# Patient Record
Sex: FEMALE | Race: WHITE | Employment: FULL TIME | URBAN - METROPOLITAN AREA
[De-identification: names, ages, dates, MRNs, and addresses within clinical notes are randomized per-mention and may not be internally consistent; named-entity substitution may affect disease eponyms.]

---

## 2019-07-29 ENCOUNTER — HOSPITAL ENCOUNTER (OUTPATIENT)
Dept: DIABETES SERVICES | Age: 64
Discharge: HOME | End: 2019-07-29
Payer: COMMERCIAL

## 2019-07-29 DIAGNOSIS — E11.9 TYPE 2 DIABETES MELLITUS WITHOUT COMPLICATION, UNSPECIFIED WHETHER LONG TERM INSULIN USE (HCC): ICD-10-CM

## 2019-07-29 PROCEDURE — 97802 MEDICAL NUTRITION INDIV IN: CPT

## 2019-07-29 PROCEDURE — 97802 MEDICAL NUTRITION INDIV IN: CPT | Performed by: DIETITIAN, REGISTERED

## 2019-07-29 NOTE — DIABETES MGMT
[facility]  [facility] Treatment  Center - Nutrition Evaluation       DATE: 2019      REFERRING PHYSICIAN: Jaydon Mcclelland MD  NAME: Vannessa El : 1955   AGE: 66 y.o. GENDER: female  REASON FOR VISIT: MNT/ Meal Planning for Diabetes    ASSESSMENT:  Past Medical Hx: 66year old  female with a history significant for diabetes, hypertension, overweight, among others. She came to visit by herself today. LABS: Only lab for evaluation is A1C on 19 where she had a result of 6.6%. We reviewed her results as well as her target range. She does not believe her A1C has ever been >8.0%. We discussed diagnostic criteria for diabetes vs prediabetes. She verbalized understanding. MEDICATIONS/SUPPLEMENTS:   Sharee Batista reports taking the following medications:  -Metformin 500 mg 2x/day (AM and PM)  -Simvastatin once daily (unknown amount)  -Lasartan once daily (unknown amount)  -Amlodipine once daily (unknown amount)    **She has a goal of getting off the metformin with changes to diet and exercise. She appears highly motivated to make these changes.    -She is also checking her BG once weekly. She will check every Thursday morning, fasting. She keeps a log in her phone that she brought with her today. Her numbers fluctuate but are anywhere from 120-170 mg/dL. She was encouraged to start to keep more notes to help identify what is affecting her numbers. FOOD ALLERGIES/INTOLERANCES: None reported    ANTHROPOMETRICS:    19  Ht: 64\"  Wt: 191.3#  IBW: 120 # +/- 10%    Adjusted IBW: 138 #  BMI: 32.83 kg/m2     Reported Wt Hx: Sharee Batista reports no recent weight loss or weight gain that she is aware of. She expressed a desire to lose weight, and has been trying to do that for a few months now.     Estimated Nutritional Needs:   8711-8187 kcal/day (using ABW x 27- ABW x 30 kcal/kg)    Reported Diet Hx:     24 Hour Diet Recall  Breakfast 10 AM Whole grain english muffin with a fried egg and sausage chandu   Lunch 2 PM [place] (white bread) with roast beef (or ham) with yogurt   Dinner 7:30 PM Berry salad from [employer] and a chicken wrap   Snacks  Fruit (watermelon), cheez-its, ice cream sandwich   Beverages  coffee w/sweet cream, diet pepsi, water, sweet tea     Exercise/Physical Actvity: Sharee Batista works out at least 4x/week. Each time is for about an hour at a time. She has a pool in which she will swim laps, or she is a member at [employer] and enjoys taking their group classes. 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 She also has 3 grandkids (age 3, 3 and 10) who she watches frequently. She reports high stress levels due to all of this. We discussed how stress could be affecting her BG levels as well as her weight. Her son in law cooks dinner at her house, so she will have to educate him on the balanced plate. NUTRITION DIAGNOSIS:  Inconsistent carbohydrate intake consistent with lack of nutrition related knowledge supported by her food recall as well as her A1C >6.5%    NUTRITION INTERVENTION:  Sharee Batista was most interested today in meal planning. She had a good understanding of what affects her BG food wise, as she has been doing research online. We spent time discussing carbs vs protein vs fats and where foods fall. She was given the goal of 45-60 grams of carbs per meal. An emphasis was placed on being consistent with her intake, and aiming for this amount PER meal. We went through her typical meals and talked about how we could change them to fit out range. She was very open to suggestions as she is highly motivated to make the changes. She was given the goal of 15-20 grams of carbs for snacks. Importance of having a carb and a protein paired together was explained and she agreed.  She was given written material on good snack options. She eats out about 3x/week, so we discussed resources she could use to help her make better choices when eating away from home. She downloaded the [employer] lud[employer]ina while in the office and was shown how to use it. We used the plate method to help show her how to make balanced meals. She understands her current intake of vegetables is not very high. PATIENT GOALS:  1) Follow the myplate method--to include a carb, vegetable and a protein at each meal  2) Aim for 45-60 grams of carbs at meals and <20 grams at snacks  3) Continue to exercise up to 300 minutes per week as able  4) Begin to read nutrition labels to help her make better food choices  5) Continue to check BG and record in her logbook      Specific tips and techniques to facilitate compliance with above recommendations were provided and discussed. Pt was strongly encourage to begin making necessary changes now and will follow up in October. If you have any questions please feel free to contact me at [phone]. [phone]

## 2022-11-09 ENCOUNTER — TRANSCRIBE ORDER (OUTPATIENT)
Dept: SCHEDULING | Age: 67
End: 2022-11-09

## 2022-11-09 DIAGNOSIS — Z91.89 OTHER SPECIFIED PERSONAL RISK FACTORS, NOT ELSEWHERE CLASSIFIED: Primary | ICD-10-CM

## 2023-12-26 ENCOUNTER — TELEPHONE (OUTPATIENT)
Facility: HOSPITAL | Age: 68
End: 2023-12-26

## 2023-12-26 DIAGNOSIS — Z17.0 MALIGNANT NEOPLASM OF LEFT BREAST IN FEMALE, ESTROGEN RECEPTOR POSITIVE, UNSPECIFIED SITE OF BREAST (HCC): Primary | ICD-10-CM

## 2023-12-26 DIAGNOSIS — C50.912 MALIGNANT NEOPLASM OF LEFT BREAST IN FEMALE, ESTROGEN RECEPTOR POSITIVE, UNSPECIFIED SITE OF BREAST (HCC): Primary | ICD-10-CM

## 2023-12-26 NOTE — TELEPHONE ENCOUNTER
[facility] Estrada  Breast Navigator Encounter    Name:    Giselle Gallagher  Age:    79 y.o. Diagnosis: left invasive mammary grd 2    Interdisciplinary Team:  Med-Onc:      Surg-Onc:  María Moise:      Plastics:      :      Nurse Navigator:  Seth ESTRELLA-RN      Encounter type:  []Patient Initiated  []Navigator Follow-up []Pre-op  []Post-op  []Check-in Prior to [employer] Treatment []Treatment Modality Change  [x]Initial Navigator Encounter []Other:       Narrative:   Reached out to patient for initial navigator contact. I explained what happens at the first consultation - an exam by the physician, a possible ultrasound, then complete discussion of surgical options and other treatment that may be considered. I encouraged the patient to bring someone with her to this appointment, They will consider this and see if someone is available. Discussed that a breast MRI has been ordered by Dr. Sriram Mcdaniel, and is the next step in her work-up. I told her I will reach out to the imaging navigator to try to get this scheduled prior to her appointment. I explained the MRI procedure to her. With the help of the imaging navigator I was able to get this MRI scheduled for 12/28 0945 [facility]. I messaged all the information to her as well. Referrals/Handouts:            Seth ESTRELLA, RN.   Breast [address] Stephanie Figueroa.  [address] [place] [address]  Office: [phone]  Cell: [phone]  F: [phone]  [email or web address]  Good Help to Those in [facility] SPECIALTY [facility]

## 2023-12-28 ENCOUNTER — HOSPITAL ENCOUNTER (OUTPATIENT)
Age: 68
Discharge: HOME | End: 2023-12-28
Payer: MEDICARE

## 2023-12-28 DIAGNOSIS — C50.912 MALIGNANT NEOPLASM OF LEFT BREAST IN FEMALE, ESTROGEN RECEPTOR POSITIVE, UNSPECIFIED SITE OF BREAST (HCC): ICD-10-CM

## 2023-12-28 DIAGNOSIS — Z17.0 MALIGNANT NEOPLASM OF LEFT BREAST IN FEMALE, ESTROGEN RECEPTOR POSITIVE, UNSPECIFIED SITE OF BREAST (HCC): ICD-10-CM

## 2023-12-28 PROCEDURE — C8908 MRI W/O FOL W/CONT, BREAST,: HCPCS

## 2023-12-28 PROCEDURE — A9585 GADOBUTROL INJECTION: HCPCS | Performed by: RADIOLOGY

## 2023-12-28 PROCEDURE — 6360000004 HC RX CONTRAST MEDICATION: Performed by: RADIOLOGY

## 2023-12-28 RX ORDER — GADOBUTROL 604.72 MG/ML
8 INJECTION INTRAVENOUS
Status: COMPLETED | OUTPATIENT
Start: 2023-12-28 | End: 2023-12-28

## 2023-12-28 RX ADMIN — GADOBUTROL 8 ML: 604.72 INJECTION INTRAVENOUS at 10:23

## 2024-01-03 ENCOUNTER — OFFICE VISIT (OUTPATIENT)
Age: 69
End: 2024-01-03

## 2024-01-03 ENCOUNTER — TELEPHONE (OUTPATIENT)
Age: 69
End: 2024-01-03

## 2024-01-03 VITALS — WEIGHT: 176 LBS | BODY MASS INDEX: 30.05 KG/M2 | HEIGHT: 64 IN

## 2024-01-03 DIAGNOSIS — C50.412 MALIGNANT NEOPLASM OF UPPER-OUTER QUADRANT OF LEFT FEMALE BREAST, UNSPECIFIED ESTROGEN RECEPTOR STATUS (HCC): Primary | ICD-10-CM

## 2024-01-03 RX ORDER — ATORVASTATIN CALCIUM 20 MG/1
TABLET, FILM COATED ORAL
COMMUNITY

## 2024-01-03 RX ORDER — HYDROCHLOROTHIAZIDE 25 MG/1
TABLET ORAL
COMMUNITY
Start: 2018-05-14

## 2024-01-03 RX ORDER — AMLODIPINE BESYLATE 10 MG/1
TABLET ORAL
COMMUNITY
Start: 2018-05-14

## 2024-01-03 RX ORDER — LOSARTAN POTASSIUM 100 MG/1
TABLET ORAL
COMMUNITY
Start: 2018-05-14

## 2024-01-03 NOTE — PROGRESS NOTES
HISTORY OF PRESENT ILLNESS  Candida Forbes is a 68 y.o. female     HPI NEW patient consult referred by  Dr. Dilcia Montana for LEFT breast invasive mammary. Felt a lump that prompted imaging and a biopsy. Denies pain or other changes to the breast     12/18/2023- LEFT breast biopsy- Invasive mammary grade 2, [place] , [place] , Her2 Low expression, Ki 6 11-20%    [facility], genetic testing, MARISOL mutation gene       Family History:  Sister- Breast cancer dx 56      Breast imaging-   MRI Result (most recent):  MRI BREAST BILATERAL W WO CONTRAST 12/28/2023    Narrative  HISTORY: 68-year-old female with newly diagnosed left breast cancer, presenting  for preoperative breast MRI.    COMPARISON: Mammography and ultrasound from December 2023. No prior MRI    TECHNIQUE:  Bilateral breast MRI was performed using a dedicated breast coil without  compression with the patient in the prone position. Precontrast T1-weighted  images with fat suppression were obtained followed by bolus injection of 8 mL  Gadavist. Postcontrast dynamic and high-resolution images were acquired.  T2-weighted axial imaging with fat suppression was also performed. The images  were analyzed using CAD analysis, enhancement curves, digital subtraction, and 2  and 3 dimensional reconstructions.    FINDINGS:    Background parenchymal enhancement: Mild    Mammographic breast density: Scattered fibroglandular breast tissue    Right breast:  There is no suspicious mass or nonmass enhancement within the right breast.  There is no skin thickening or nipple retraction.    There is no suspicious axillary or internal mammary chain lymphadenopathy.    Left breast:  Within the middle third of the left breast at 12:00, there is a round  ill-defined malignant mass containing a biopsy clip, consistent with newly  diagnosed breast carcinoma. The mass measures up to 1.2 x 1.2 x 0.8 cm. There is  no evidence of multifocal or multicentric malignancy. There is no skin  thickening or

## 2024-01-04 ENCOUNTER — TELEPHONE (OUTPATIENT)
Age: 69
End: 2024-01-04

## 2024-01-04 NOTE — TELEPHONE ENCOUNTER
Social Work  Psychosocial Assessment    Reason for Assessment:      [] Social Work Referral [x] Initial Assessment [x] New Diagnosis [] Other    Diagnosis: Left breast cancer    Advance Care Planning: Not on file; conversation deferred.        1/4/2024    11:05 AM   Demographics   Marital Status        Sources of Information:    [x]Patient  []Family  []Staff  [x]Medical Record    Mental Status:    [x]Alert  []Lethargic  []Unresponsive   [] Unable to assess   Oriented to:  [x]Person  [x]Place  [x]Time  [x]Situation      Relationship Status:  []Single  [x]  []Significant Other/Life Partner  []  []  []    Living Circumstances:  []Lives Alone  [x]Family/Significant Other in Household  []Roommates  []Children in the Home  []Paid Caregivers  []Assisted Living Facility/Group Home  []Skilled Nursing Facility  []Homeless  []Incarcerated  []Environmental/Care Concerns  []Other:    Employment Status:  [x]Employed Full-time []Employed Part-time []Homemaker  [] Retired [] Short-Term Disability [] Long-Term Disability  [] Unemployed   [] SSI   [] SSDI  [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 [] Yes [] No  Is the patient involved in a spiritual, ghulam or Latter-day community? [] Yes [] No  Patient expressing spiritual/existential angst? [] Yes [x] No  Notes:    Support System:    Identified Support Person/Group: Spouse, sister, children, grandchildren  [x]Strong  []Fair  []Limited    Coping with Illness:   [x]  Coping Well  [] Challenges Coping with

## 2024-01-12 ENCOUNTER — CLINICAL DOCUMENTATION (OUTPATIENT)
Age: 69
End: 2024-01-12

## 2024-01-16 ENCOUNTER — PREP FOR PROCEDURE (OUTPATIENT)
Age: 69
End: 2024-01-16

## 2024-01-16 DIAGNOSIS — C50.412 MALIGNANT NEOPLASM OF UPPER-OUTER QUADRANT OF LEFT FEMALE BREAST, UNSPECIFIED ESTROGEN RECEPTOR STATUS (HCC): Primary | ICD-10-CM

## 2024-02-14 DIAGNOSIS — C50.412 MALIGNANT NEOPLASM OF UPPER-OUTER QUADRANT OF LEFT FEMALE BREAST, UNSPECIFIED ESTROGEN RECEPTOR STATUS (HCC): Primary | ICD-10-CM

## 2024-02-15 ENCOUNTER — CLINICAL DOCUMENTATION (OUTPATIENT)
Age: 69
End: 2024-02-15

## 2024-02-15 DIAGNOSIS — C50.412 MALIGNANT NEOPLASM OF UPPER-OUTER QUADRANT OF LEFT FEMALE BREAST, UNSPECIFIED ESTROGEN RECEPTOR STATUS (HCC): Primary | ICD-10-CM

## 2024-02-15 NOTE — PROGRESS NOTES
Patient Surgery Information Sheet      Patient Name:  Candida Forbes  Surgery Date:  February 22, 2024    Type of Surgery:  LEFT BREAST ULTRASOUND GUIDED LUMPECTOMY LEFT BREAST SENTINEL NODE BIOPSY     Estimated arrival time 7:00 AM    Arrival time will be confirmed the afternoon before your surgery.    Pre-procedure: Blue dye injection  on 2/22/2024 at 7:30 am (arrival 7:00 am)    Pre-Operative Testing Department will call to schedule pre-op testing appointment if needed before surgery    Hospital:  [facility]   Address:  [address] [place] [address]  Check in location:  Medical Office Building III, the second surgery center past the Emergency Room    Pre-Operative Instructions:  Will be given at the pre-op appointment.    Special Instructions if needed:     NPO (nothing by mouth) or drinking after midnight the night before Surgery  Patient may shower the morning of, do not use any lotion, deodorant, powders, perfumes or makeup  Patient will need  the morning of surgery     POST OPERATIVE VISIT: 3/6/2024 at 3:15 pm with Dr. Bell    Surgery Scheduler:   Feliciano Driver

## 2024-02-20 ENCOUNTER — CLINICAL DOCUMENTATION (OUTPATIENT)
Age: 69
End: 2024-02-20

## 2024-02-20 NOTE — PROGRESS NOTES
Patient surgery has been cancelled for 2/22/2024, she called stating that she's running a fever. Will re-schedule patient for a later date.

## 2024-02-22 DIAGNOSIS — C50.412 MALIGNANT NEOPLASM OF UPPER-OUTER QUADRANT OF LEFT FEMALE BREAST, UNSPECIFIED ESTROGEN RECEPTOR STATUS (HCC): Primary | ICD-10-CM

## 2024-03-11 ENCOUNTER — CLINICAL DOCUMENTATION (OUTPATIENT)
Age: 69
End: 2024-03-11

## 2024-03-11 NOTE — PROGRESS NOTES
Patient Surgery Information Sheet      Patient Name:  Candida Forbes  Surgery Date:  April 4, 2024    Type of Surgery:  LEFT BREAST ULTRASOUND GUIDED LUMPECTOMY LEFT BREAST SENTINEL NODE BIOPSY      Estimated arrival time 6:00 AM    Arrival time will be confirmed the afternoon before your surgery.    Pre-procedure: Blue dye injection ON 4/3/2024 at 2:30 pm (arrive 2:00 pm)     Pre-Operative Testing Department will call to schedule pre-op testing appointment if needed before surgery    [facility]   Address:  [address] [place] [address]  Check in location:  Medical Office Building III, the second surgery center past the Emergency Room    Pre-Operative Instructions:  Will be given at the pre-op appointment.    Special Instructions if needed:     NPO (nothing by mouth) or drinking after midnight the night before Surgery  Patient may shower the morning of, do not use any lotion, deodorant, powders, perfumes or makeup  Patient will need  the morning of surgery     POST OPERATIVE VISIT: 4/24/2024 at 3:15 pm with Dr. Bell    Surgery Scheduler:   Feliciano Driver

## 2024-03-21 NOTE — PERIOP NOTE
[facility]  Ambulatory Surgery Unit  [address] [address]  [address]  Pre-operative Instructions    Surgery/Procedure Date  Thursday 4/4            Tentative Arrival Time TBD      1. On the day of your surgery/procedure, please report to the Ambulatory Surgery Unit Registration Desk and sign in at your designated time. The Ambulatory Surgery Unit is located in [facility] on the [facility] side of the [facility] across from the [facility]. Please have all of your health insurance cards, co-payment, and a photo ID.    **TWO adults may accompany you the day of the procedure.  We have limited seating available.  If our waiting room is at capacity, your ride may be asked to remain in their vehicle.  No one under 15 is allowed in the waiting room.      2. You must have someone stay here during the whole procedure, and able to drive you home, as you should not drive a car for 24 hours following anesthesia. Please make arrangements for a responsible adult friend or family member to stay with you for at least the first 24 hours after your surgery.    3. Do not have anything to eat or drink (including water, gum, mints, coffee, juice) after 11:59 PM Wednesday 4/3 . This may not apply to medications prescribed by your physician.  (Please note below the special instructions with medications to take the morning of surgery, if applicable.)    4. We recommend you do not drink any alcoholic beverages for 24 hours before and after your surgery.    5. Contact your surgeon’s office for instructions on the following medications: non-steroidal anti-inflammatory drugs (i.e. Advil, Aleve), vitamins, and supplements. (Some surgeon’s will want you to stop these medications prior to surgery and others may allow you to take them)   **If you are currently taking Plavix, Coumadin, Aspirin and/or other blood-thinning agents, contact your surgeon for instructions.** Your surgeon will partner

## 2024-04-03 ENCOUNTER — HOSPITAL ENCOUNTER (OUTPATIENT)
Facility: HOSPITAL | Age: 69
Discharge: HOME | End: 2024-04-06
Attending: SURGERY
Payer: MEDICARE

## 2024-04-03 ENCOUNTER — ANESTHESIA EVENT (OUTPATIENT)
Facility: HOSPITAL | Age: 69
End: 2024-04-03
Payer: MEDICARE

## 2024-04-03 DIAGNOSIS — C50.412 MALIGNANT NEOPLASM OF UPPER-OUTER QUADRANT OF LEFT FEMALE BREAST, UNSPECIFIED ESTROGEN RECEPTOR STATUS (HCC): ICD-10-CM

## 2024-04-03 PROCEDURE — 78195 LYMPH SYSTEM IMAGING: CPT

## 2024-04-03 PROCEDURE — 3430000000 HC RX DIAGNOSTIC RADIOPHARMACEUTICAL: Performed by: SURGERY

## 2024-04-03 PROCEDURE — A9520 TC99 TILMANOCEPT DIAG 0.5MCI: HCPCS | Performed by: SURGERY

## 2024-04-03 RX ADMIN — TILMANOCEPT 1.1 MILLICURIE: KIT at 14:21

## 2024-04-03 RX ADMIN — TILMANOCEPT 1 MILLICURIE: KIT at 14:21

## 2024-04-04 ENCOUNTER — HOSPITAL ENCOUNTER (OUTPATIENT)
Facility: HOSPITAL | Age: 69
Setting detail: OUTPATIENT SURGERY
Discharge: HOME | End: 2024-04-04
Attending: SURGERY | Admitting: SURGERY
Payer: MEDICARE

## 2024-04-04 ENCOUNTER — ANESTHESIA (OUTPATIENT)
Facility: HOSPITAL | Age: 69
End: 2024-04-04
Payer: MEDICARE

## 2024-04-04 VITALS
BODY MASS INDEX: 30.77 KG/M2 | HEART RATE: 101 BPM | DIASTOLIC BLOOD PRESSURE: 79 MMHG | WEIGHT: 180.2 LBS | OXYGEN SATURATION: 99 % | HEIGHT: 64 IN | SYSTOLIC BLOOD PRESSURE: 135 MMHG | RESPIRATION RATE: 18 BRPM | TEMPERATURE: 98.2 F

## 2024-04-04 DIAGNOSIS — C50.412 MALIGNANT NEOPLASM OF UPPER-OUTER QUADRANT OF LEFT FEMALE BREAST, UNSPECIFIED ESTROGEN RECEPTOR STATUS (HCC): ICD-10-CM

## 2024-04-04 DIAGNOSIS — G89.18 PAIN FOLLOWING SURGERY OR PROCEDURE: Primary | ICD-10-CM

## 2024-04-04 LAB
GLUCOSE BLD STRIP.AUTO-MCNC: 210 MG/DL (ref 65–117)
SERVICE CMNT-IMP: ABNORMAL

## 2024-04-04 PROCEDURE — 2500000003 HC RX 250 WO HCPCS: Performed by: SURGERY

## 2024-04-04 PROCEDURE — 88341 IMHCHEM/IMCYTCHM EA ADD ANTB: CPT

## 2024-04-04 PROCEDURE — 7100000010 HC PHASE II RECOVERY - FIRST 15 MIN: Performed by: SURGERY

## 2024-04-04 PROCEDURE — 6360000002 HC RX W HCPCS: Performed by: SURGERY

## 2024-04-04 PROCEDURE — 3700000000 HC ANESTHESIA ATTENDED CARE: Performed by: SURGERY

## 2024-04-04 PROCEDURE — 3600000013 HC SURGERY LEVEL 3 ADDTL 15MIN: Performed by: SURGERY

## 2024-04-04 PROCEDURE — 3700000001 HC ADD 15 MINUTES (ANESTHESIA): Performed by: SURGERY

## 2024-04-04 PROCEDURE — 82962 GLUCOSE BLOOD TEST: CPT

## 2024-04-04 PROCEDURE — 88342 IMHCHEM/IMCYTCHM 1ST ANTB: CPT

## 2024-04-04 PROCEDURE — 2709999900 HC NON-CHARGEABLE SUPPLY: Performed by: SURGERY

## 2024-04-04 PROCEDURE — 2580000003 HC RX 258: Performed by: SURGERY

## 2024-04-04 PROCEDURE — A4648 IMPLANTABLE TISSUE MARKER: HCPCS | Performed by: SURGERY

## 2024-04-04 PROCEDURE — 6360000002 HC RX W HCPCS: Performed by: NURSE ANESTHETIST, CERTIFIED REGISTERED

## 2024-04-04 PROCEDURE — 2580000003 HC RX 258: Performed by: ANESTHESIOLOGY

## 2024-04-04 PROCEDURE — 7100000001 HC PACU RECOVERY - ADDTL 15 MIN: Performed by: SURGERY

## 2024-04-04 PROCEDURE — 3600000003 HC SURGERY LEVEL 3 BASE: Performed by: SURGERY

## 2024-04-04 PROCEDURE — 7100000011 HC PHASE II RECOVERY - ADDTL 15 MIN: Performed by: SURGERY

## 2024-04-04 PROCEDURE — 88307 TISSUE EXAM BY PATHOLOGIST: CPT

## 2024-04-04 PROCEDURE — 88304 TISSUE EXAM BY PATHOLOGIST: CPT

## 2024-04-04 PROCEDURE — 7100000000 HC PACU RECOVERY - FIRST 15 MIN: Performed by: SURGERY

## 2024-04-04 PROCEDURE — 2500000003 HC RX 250 WO HCPCS: Performed by: NURSE ANESTHETIST, CERTIFIED REGISTERED

## 2024-04-04 DEVICE — VERAFORM ADAPTABLE TISSUE MARKER
Type: IMPLANTABLE DEVICE | Status: FUNCTIONAL
Brand: VERAFORM

## 2024-04-04 RX ORDER — FENTANYL CITRATE 50 UG/ML
INJECTION, SOLUTION INTRAMUSCULAR; INTRAVENOUS PRN
Status: DISCONTINUED | OUTPATIENT
Start: 2024-04-04 | End: 2024-04-04 | Stop reason: SDUPTHER

## 2024-04-04 RX ORDER — MEPERIDINE HYDROCHLORIDE 25 MG/ML
12.5 INJECTION INTRAMUSCULAR; INTRAVENOUS; SUBCUTANEOUS EVERY 5 MIN PRN
Status: DISCONTINUED | OUTPATIENT
Start: 2024-04-04 | End: 2024-04-04 | Stop reason: HOSPADM

## 2024-04-04 RX ORDER — DROPERIDOL 2.5 MG/ML
0.62 INJECTION, SOLUTION INTRAMUSCULAR; INTRAVENOUS
Status: DISCONTINUED | OUTPATIENT
Start: 2024-04-04 | End: 2024-04-04 | Stop reason: HOSPADM

## 2024-04-04 RX ORDER — SODIUM CHLORIDE 9 MG/ML
INJECTION, SOLUTION INTRAVENOUS PRN
Status: DISCONTINUED | OUTPATIENT
Start: 2024-04-04 | End: 2024-04-04 | Stop reason: HOSPADM

## 2024-04-04 RX ORDER — OXYCODONE HYDROCHLORIDE 5 MG/1
5 TABLET ORAL
Status: DISCONTINUED | OUTPATIENT
Start: 2024-04-04 | End: 2024-04-04 | Stop reason: HOSPADM

## 2024-04-04 RX ORDER — PHENYLEPHRINE HCL IN 0.9% NACL 0.4MG/10ML
SYRINGE (ML) INTRAVENOUS PRN
Status: DISCONTINUED | OUTPATIENT
Start: 2024-04-04 | End: 2024-04-04 | Stop reason: SDUPTHER

## 2024-04-04 RX ORDER — ONDANSETRON 2 MG/ML
INJECTION INTRAMUSCULAR; INTRAVENOUS PRN
Status: DISCONTINUED | OUTPATIENT
Start: 2024-04-04 | End: 2024-04-04 | Stop reason: SDUPTHER

## 2024-04-04 RX ORDER — EPHEDRINE SULFATE/0.9% NACL/PF 25 MG/5 ML
SYRINGE (ML) INTRAVENOUS PRN
Status: DISCONTINUED | OUTPATIENT
Start: 2024-04-04 | End: 2024-04-04 | Stop reason: SDUPTHER

## 2024-04-04 RX ORDER — DEXAMETHASONE SODIUM PHOSPHATE 4 MG/ML
INJECTION, SOLUTION INTRA-ARTICULAR; INTRALESIONAL; INTRAMUSCULAR; INTRAVENOUS; SOFT TISSUE PRN
Status: DISCONTINUED | OUTPATIENT
Start: 2024-04-04 | End: 2024-04-04 | Stop reason: SDUPTHER

## 2024-04-04 RX ORDER — SODIUM CHLORIDE 0.9 % (FLUSH) 0.9 %
5-40 SYRINGE (ML) INJECTION PRN
Status: DISCONTINUED | OUTPATIENT
Start: 2024-04-04 | End: 2024-04-04 | Stop reason: HOSPADM

## 2024-04-04 RX ORDER — FENTANYL CITRATE 50 UG/ML
25 INJECTION, SOLUTION INTRAMUSCULAR; INTRAVENOUS EVERY 5 MIN PRN
Status: DISCONTINUED | OUTPATIENT
Start: 2024-04-04 | End: 2024-04-04 | Stop reason: HOSPADM

## 2024-04-04 RX ORDER — WATER 10 ML/10ML
INJECTION INTRAMUSCULAR; INTRAVENOUS; SUBCUTANEOUS
Status: DISCONTINUED
Start: 2024-04-04 | End: 2024-04-04 | Stop reason: HOSPADM

## 2024-04-04 RX ORDER — ONDANSETRON 4 MG/1
4 TABLET, FILM COATED ORAL 3 TIMES DAILY PRN
Qty: 15 TABLET | Refills: 0 | Status: SHIPPED | OUTPATIENT
Start: 2024-04-04

## 2024-04-04 RX ORDER — CEFAZOLIN SODIUM 1 G/3ML
INJECTION, POWDER, FOR SOLUTION INTRAMUSCULAR; INTRAVENOUS
Status: DISCONTINUED
Start: 2024-04-04 | End: 2024-04-04 | Stop reason: HOSPADM

## 2024-04-04 RX ORDER — LIDOCAINE HYDROCHLORIDE 10 MG/ML
1 INJECTION, SOLUTION EPIDURAL; INFILTRATION; INTRACAUDAL; PERINEURAL
Status: DISCONTINUED | OUTPATIENT
Start: 2024-04-04 | End: 2024-04-04 | Stop reason: HOSPADM

## 2024-04-04 RX ORDER — HYDROMORPHONE HYDROCHLORIDE 1 MG/ML
0.5 INJECTION, SOLUTION INTRAMUSCULAR; INTRAVENOUS; SUBCUTANEOUS EVERY 5 MIN PRN
Status: DISCONTINUED | OUTPATIENT
Start: 2024-04-04 | End: 2024-04-04 | Stop reason: HOSPADM

## 2024-04-04 RX ORDER — MIDAZOLAM HYDROCHLORIDE 1 MG/ML
INJECTION INTRAMUSCULAR; INTRAVENOUS PRN
Status: DISCONTINUED | OUTPATIENT
Start: 2024-04-04 | End: 2024-04-04 | Stop reason: SDUPTHER

## 2024-04-04 RX ORDER — NALOXONE HYDROCHLORIDE 0.4 MG/ML
INJECTION, SOLUTION INTRAMUSCULAR; INTRAVENOUS; SUBCUTANEOUS PRN
Status: DISCONTINUED | OUTPATIENT
Start: 2024-04-04 | End: 2024-04-04 | Stop reason: HOSPADM

## 2024-04-04 RX ORDER — DIPHENHYDRAMINE HYDROCHLORIDE 50 MG/ML
12.5 INJECTION INTRAMUSCULAR; INTRAVENOUS
Status: DISCONTINUED | OUTPATIENT
Start: 2024-04-04 | End: 2024-04-04 | Stop reason: HOSPADM

## 2024-04-04 RX ORDER — OXYCODONE HYDROCHLORIDE AND ACETAMINOPHEN 5; 325 MG/1; MG/1
1 TABLET ORAL EVERY 4 HOURS PRN
Qty: 15 TABLET | Refills: 0 | Status: SHIPPED | OUTPATIENT
Start: 2024-04-04 | End: 2024-04-07

## 2024-04-04 RX ORDER — SODIUM CHLORIDE, SODIUM LACTATE, POTASSIUM CHLORIDE, CALCIUM CHLORIDE 600; 310; 30; 20 MG/100ML; MG/100ML; MG/100ML; MG/100ML
INJECTION, SOLUTION INTRAVENOUS CONTINUOUS
Status: DISCONTINUED | OUTPATIENT
Start: 2024-04-04 | End: 2024-04-04 | Stop reason: HOSPADM

## 2024-04-04 RX ADMIN — EPHEDRINE SULFATE 5 MG: 5 INJECTION INTRAVENOUS at 08:04

## 2024-04-04 RX ADMIN — LIDOCAINE HYDROCHLORIDE 100 MG: 20 INJECTION, SOLUTION EPIDURAL; INFILTRATION; INTRACAUDAL; PERINEURAL at 07:36

## 2024-04-04 RX ADMIN — FENTANYL CITRATE 50 MCG: 50 INJECTION, SOLUTION INTRAMUSCULAR; INTRAVENOUS at 07:48

## 2024-04-04 RX ADMIN — EPHEDRINE SULFATE 10 MG: 5 INJECTION INTRAVENOUS at 08:08

## 2024-04-04 RX ADMIN — FENTANYL CITRATE 50 MCG: 50 INJECTION, SOLUTION INTRAMUSCULAR; INTRAVENOUS at 08:12

## 2024-04-04 RX ADMIN — EPHEDRINE SULFATE 5 MG: 5 INJECTION INTRAVENOUS at 08:23

## 2024-04-04 RX ADMIN — Medication 80 MCG: at 08:37

## 2024-04-04 RX ADMIN — PROPOFOL 150 MG: 10 INJECTION, EMULSION INTRAVENOUS at 07:36

## 2024-04-04 RX ADMIN — ONDANSETRON 4 MG: 2 INJECTION INTRAMUSCULAR; INTRAVENOUS at 08:19

## 2024-04-04 RX ADMIN — MIDAZOLAM HYDROCHLORIDE 2 MG: 1 INJECTION, SOLUTION INTRAMUSCULAR; INTRAVENOUS at 07:30

## 2024-04-04 RX ADMIN — SODIUM CHLORIDE, POTASSIUM CHLORIDE, SODIUM LACTATE AND CALCIUM CHLORIDE: 600; 310; 30; 20 INJECTION, SOLUTION INTRAVENOUS at 06:47

## 2024-04-04 RX ADMIN — DEXAMETHASONE SODIUM PHOSPHATE 8 MG: 4 INJECTION, SOLUTION INTRAMUSCULAR; INTRAVENOUS at 07:48

## 2024-04-04 RX ADMIN — WATER 2000 MG: 1 INJECTION INTRAMUSCULAR; INTRAVENOUS; SUBCUTANEOUS at 07:40

## 2024-04-04 ASSESSMENT — PAIN - FUNCTIONAL ASSESSMENT: PAIN_FUNCTIONAL_ASSESSMENT: 0-10

## 2024-04-04 NOTE — PROGRESS NOTES
Permission received to review discharge instructions and discuss private health information with Juan.    Patient states family/friend will be with them for 24 hours following procedure.

## 2024-04-04 NOTE — PERIOP NOTE
Received to PACU, oral airway in place. Drowsy but arouses to verbal stimuli.    0855 Awake, oral airway removed. Ice pack applied to Left breast/axilla incisions. Surgical bra in place.    0900 HOB elevated, awake, alert, sipping diet ginger ale. Glasses returned, wedding ring intact to left ring finger, tape removed.    0904 D/C instructions reviewed with  Juan via phone    0910 VSS, denies discomfort.    0924 Discharged to home via/wc,accompanied to car per RN. Skin warm and dry, awake and alert. Respirations even, unlabored. Pt and family members questions and concerns addressed prior to discharge. All belongings (glasses, cell phone, wedding ring, ID cards) with pt.

## 2024-04-04 NOTE — H&P
HISTORY OF PRESENT ILLNESS  Candida Forbes is a 68 y.o. female      HPI NEW patient consult referred by  Dr. Dilcia Montana for LEFT breast invasive mammary. Felt a lump that prompted imaging and a biopsy. Denies pain or other changes to the breast      12/18/2023- LEFT breast biopsy- Invasive mammary grade 2, ER , [place] , Her2 Low expression, Ki 6 11-20%     [facility], genetic testing, MARISOL mutation gene         Family History:  Sister- Breast cancer dx 56        Breast imaging-   MRI Result (most recent):  MRI BREAST BILATERAL W WO CONTRAST 12/28/2023     Narrative  HISTORY: 68-year-old female with newly diagnosed left breast cancer, presenting  for preoperative breast MRI.     COMPARISON: Mammography and ultrasound from December 2023. No prior MRI     TECHNIQUE:  Bilateral breast MRI was performed using a dedicated breast coil without  compression with the patient in the prone position. Precontrast T1-weighted  images with fat suppression were obtained followed by bolus injection of 8 mL  Gadavist. Postcontrast dynamic and high-resolution images were acquired.  T2-weighted axial imaging with fat suppression was also performed. The images  were analyzed using CAD analysis, enhancement curves, digital subtraction, and 2  and 3 dimensional reconstructions.     FINDINGS:     Background parenchymal enhancement: Mild     Mammographic breast density: Scattered fibroglandular breast tissue     Right breast:  There is no suspicious mass or nonmass enhancement within the right breast.  There is no skin thickening or nipple retraction.     There is no suspicious axillary or internal mammary chain lymphadenopathy.     Left breast:  Within the middle third of the left breast at 12:00, there is a round  ill-defined malignant mass containing a biopsy clip, consistent with newly  diagnosed breast carcinoma. The mass measures up to 1.2 x 1.2 x 0.8 cm. There is  no evidence of multifocal or multicentric malignancy. There is no

## 2024-04-04 NOTE — ANESTHESIA POSTPROCEDURE EVALUATION
Department of Anesthesiology  Postprocedure Note    Patient: Candida Forbes  MRN: [id number]  YOB: 1955  Date of evaluation: 4/4/2024    Procedure Summary       Date: 04/04/24 Room / Location: [facility] ASU B4 / [facility] AMBUL[facility] OR    Anesthesia Start: 0730 Anesthesia Stop: 0849    Procedure: LEFT BREAST ULTRASOUND GUIDED LUMPECTOMY LEFT BREAST SENTINEL NODE BIOPSY (Left: Breast) Diagnosis:       Malignant neoplasm of upper-outer quadrant of left female breast (HCC)      (Malignant neoplasm of upper-outer quadrant of left female breast (HCC) [C50.412])    Surgeons: Ninfa Bell MD Responsible Provider: Lizette Dale MD    Anesthesia Type: General ASA Status: 2            Anesthesia Type: General    Gunner Phase I: Gunner Score: 10    Gunner Phase II: Gunner Score: 10    Anesthesia Post Evaluation    Patient location during evaluation: PACU  Patient participation: complete - patient participated  Level of consciousness: awake and alert  Pain score: 0  Airway patency: patent  Nausea & Vomiting: no nausea and no vomiting  Cardiovascular status: blood pressure returned to baseline and hemodynamically stable  Respiratory status: acceptable  Hydration status: euvolemic  Multimodal analgesia pain management approach  Pain management: satisfactory to patient    No notable events documented.

## 2024-04-04 NOTE — OP NOTE
[facility]              [address] [place]  [address]                            OPERATIVE REPORT      PATIENT NAME: ABRAHAM WYMAN               : 1955  MED REC NO: [id number]                       ROOM: [facility]  ACCOUNT NO: [id number]                       ADMIT DATE: 2024  PROVIDER: Ninfa Bell MD    DATE OF SERVICE:  2024    PREOPERATIVE DIAGNOSES:  Left breast cancer, upper outer quadrant.    POSTOPERATIVE DIAGNOSES:  Left breast cancer, upper outer quadrant.    PROCEDURES PERFORMED:  Left ultrasound-guided lumpectomy, left deep axillary sentinel node biopsy, intraoperative margin assessment using RF spectroscopy, VeraForm suture placement.    SURGEON:  Ninfa Bell MD    ASSISTANT:  Geovanna Mcallister SA.    ANESTHESIA:  General.    ESTIMATED BLOOD LOSS:  Minimal.    SPECIMENS REMOVED:       1. Left axillary sentinel node #1.     2. Left axillary sentinel node #2.     3. Left breast skin tag.     4. Left breast lumpectomy.    INTRAOPERATIVE FINDINGS:  Nodes sent for permanent.     COMPLICATIONS:  None.    IMPLANTS:  VeraForm.    INDICATIONS:  68-year-old female with left breast cancer, who needed lumpectomy and deep axillary sentinel node biopsy.    DESCRIPTION OF PROCEDURE:  The patient was seen in the preop holding area, where surgical site was marked by the surgeon and informed consent was obtained.  She was taken to the operating room and laid in supine position, where general endotracheal anesthesia was induced.  Left breast prepped and draped in the usual fashion.  A time-out was performed.  Attention was turned to the inferior axilla, axillary hairline incision was made with a 15 blade.  Of note, there was a large skin tag close to this and this was excised being that it was in the field.  Bovie cautery was used to dissect through the axillary fascia.  Two sentinel nodes were identified.  These were deep axillary sentinel nodes.

## 2024-04-04 NOTE — DISCHARGE INSTRUCTIONS
Discharge Instructions from Dr. Bell    I will call you with the pathology results, typically within 1 week from today.  You may shower, but no hot tubs, swimming pools, or baths until your incision is healed.  No heavy lifting with the affected extremity (nothing greater than 5 pounds), and limit its use for the next 4-5 days.  You may use an ice pack for comfort for the next couple of days, but do not place ice directly on the skin.  Rather, use a towel or clothing to serve as a barrier between skin and ice to prevent injury.  If I placed a drain, follow the drain instructions provided, especially as you keep a record of the drain output.  Follow medication instructions carefully. No aspirin, ibuprofen or aleve. May take tylenol instead narcotic  Wear surgical bra for 24 hours, then remove. Wear supportive bra at all times.  You will have bruising and swelling  Watch for signs of infection as listed below.  Redness  Swelling  Drainage from the incision or from your nipple that appears infected  Fever over 101.5 degrees for consecutive readings, or over 99.5 if you are currently undergoing chemotherapy.  Call our office (number is below) for a follow-up appointment.  If you have any problems, our phone number is [phone]     TAKE NARCOTIC PAIN MEDICATIONS WITH FOOD     Narcotics tend to be constipating, we suggest taking a stool softener such as Colace or Miralax (follow package instructions).    DO NOT DRIVE WHILE TAKING NARCOTIC PAIN MEDICATIONS.    DO NOT TAKE SLEEPING MEDICATIONS OR ANTIANXIETY MEDICATIONS WHILE TAKING NARCOTIC PAIN MEDICATIONS,  ESPECIALLY THE NIGHT OF ANESTHESIA!    CPAP PATIENTS BE SURE TO WEAR MACHINE WHENEVER NAPPING OR SLEEPING!    PATIENT INSTRUCTIONS:    After General Anesthesia or Intravenous Sedation, for 24 hours or while taking prescription Narcotics:        Someone should be with you for the next 24 hours.        For your own safety, a responsible adult must drive you

## 2024-04-04 NOTE — PERIOP NOTE
Pt unable to remove wedding ring on left ring finger, per Dr. Bell ring remains in place. Jewelry waiver signed.

## 2024-04-04 NOTE — ANESTHESIA PRE PROCEDURE
01/03/24 79.8 kg (176 lb)     Body mass index is 30.93 kg/m².    CBC: No results found for: \"WBC\", \"RBC\", \"HGB\", \"HCT\", \"MCV\", \"RDW\", \"PLT\"    CMP: No results found for: \"NA\", \"K\", \"CL\", \"CO2\", \"BUN\", \"CREATININE\", \"GFRAA\", \"AGRATIO\", \"LABGLOM\", \"GLUCOSE\", \"GLU\", \"PROT\", \"CALCIUM\", \"BILITOT\", \"ALKPHOS\", \"AST\", \"ALT\"    POC Tests:   Recent Labs     04/04/24  0647   POCGLU 210*       Coags: No results found for: \"PROTIME\", \"INR\", \"APTT\"    HCG (If Applicable): No results found for: \"PREGTESTUR\", \"PREGSERUM\", \"HCG\", \"HCGQUANT\"     ABGs: No results found for: \"PHART\", \"PO2ART\", \"RXY5LXY\", \"LXM8NKW\", \"BEART\", \"R8TTYLWE\"     Type & Screen (If Applicable):  No results found for: \"LABABO\", \"LABRH\"    Drug/Infectious Status (If Applicable):  No results found for: \"HIV\", \"HEPCAB\"    COVID-19 Screening (If Applicable): No results found for: \"COVID19\"        Anesthesia Evaluation  Patient summary reviewed and Nursing notes reviewed   history of anesthetic complications (denies motion sickness): PONV.  Airway: Mallampati: III  TM distance: >3 FB   Neck ROM: full  Mouth opening: > = 3 FB   Dental:    (+) implants      Pulmonary: breath sounds clear to auscultation                             Cardiovascular:  Exercise tolerance: good (>4 METS)  (+) hypertension:, hyperlipidemia        Rhythm: regular  Rate: normal                    Neuro/Psych:   Negative Neuro/Psych ROS              GI/Hepatic/Renal: Neg GI/Hepatic/Renal ROS            Endo/Other:    (+) DiabetesType II DM, malignancy/cancer.                  ROS comment: Left breast cancer Abdominal:             Vascular: negative vascular ROS.         Other Findings:       Anesthesia Plan      general     ASA 2       Induction: intravenous.    MIPS: Postoperative opioids intended and Prophylactic antiemetics administered.  Anesthetic plan and risks discussed with patient.      Plan discussed with CRNA.                Lizette Dale MD   4/4/2024

## 2024-04-04 NOTE — BRIEF OP NOTE
Brief Postoperative Note      Patient: Candida Forbes  YOB: 1955  MRN: [id number]    Date of Procedure: 4/4/2024    Pre-Op Diagnosis Codes:     * Malignant neoplasm of upper-outer quadrant of left female breast (HCC) [C50.412]    Post-Op Diagnosis: Same       Procedure(s):  LEFT BREAST ULTRASOUND GUIDED LUMPECTOMY LEFT BREAST SENTINEL NODE BIOPSY    Surgeon(s):  Ninfa Bell MD    Assistant:  Surgical Assistant: Geovanna Mcallister    Anesthesia: General    Estimated Blood Loss (mL): Minimal    Complications: None    Specimens:   ID Type Source Tests Collected by Time Destination   1 : Left axillary sentinel node #1 Tissue Breast SURGICAL PATHOLOGY Ninfa Bell MD 4/4/2024 0755    2 : Left axillary sentinel node #2 Tissue Breast SURGICAL PATHOLOGY Ninfa Bell MD 4/4/2024 0757    3 : Left breast skin tag Tissue Breast SURGICAL PATHOLOGY Ninfa Bell MD 4/4/2024 0811    4 : Left breast lumpectomy Tissue Breast SURGICAL PATHOLOGY Ninfa Bell MD 4/4/2024 0802        Implants:  Implant Name Type Inv. Item Serial No.  Lot No. LRB No. Used Action   MARKER TISS VERAFORM - SNA  MARKER TISS VERAFORM NA [employer] [id number] Left 1 Implanted         Drains: * No LDAs found *    Findings:  Infection Present At Time Of Surgery (PATOS) (choose all levels that have infection present):  No infection present  Other Findings: nodes sent permanent         Electronically signed by Ninfa Bell MD on 4/4/2024 at 8:33 AM

## 2024-04-04 NOTE — PERIOP NOTE
Candida SALEH [place]  1955  [id number]    Situation:  Verbal report given from: Akosua TUCKER and Nat CLIFFORD  Procedure: Procedure(s):  LEFT BREAST ULTRASOUND GUIDED LUMPECTOMY LEFT BREAST SENTINEL NODE BIOPSY    Background:    Preoperative diagnosis: Malignant neoplasm of upper-outer quadrant of left female breast (HCC) [C50.412]    Postoperative diagnosis: * No post-op diagnosis entered *    :  Dr. Bell    Assistant(s): Circulator: Barbara Murdock RN  Surgical Assistant: Geovanna Mcallister  Scrub Person First: Dannie Khan  Scrub Person Second: Emerita Florence RN    Specimens:   ID Type Source Tests Collected by Time Destination   1 : Left axillary sentinel node #1 Tissue Breast SURGICAL PATHOLOGY Ninfa Bell MD 4/4/2024 0755    2 : Left axillary sentinel node #2 Tissue Breast SURGICAL PATHOLOGY Ninfa Bell MD 4/4/2024 0757    3 : Left breast skin tag Tissue Breast SURGICAL PATHOLOGY Ninfa Bell MD 4/4/2024 0811    4 : Left breast lumpectomy Tissue Breast SURGICAL PATHOLOGY Ninfa Bell MD 4/4/2024 0802        Assessment:  Intra-procedure medications         Anesthesia gave intra-procedure sedation and medications, see anesthesia flow sheet     Intravenous fluids: LR@ KVO     Vital signs stable       Recommendation:    Permission to share finding with osvaldo

## 2024-04-05 ENCOUNTER — TELEPHONE (OUTPATIENT)
Age: 69
End: 2024-04-05

## 2024-04-05 NOTE — TELEPHONE ENCOUNTER
I attempted to call patient for post op wellness check.  I left a voicemail for the patient to call us back if she has any issues or concerns

## 2024-04-09 ENCOUNTER — TELEPHONE (OUTPATIENT)
Age: 69
End: 2024-04-09

## 2024-04-09 NOTE — TELEPHONE ENCOUNTER
Mammaprint TRF faxed to [facility].  Confirmation fax received.  [facility] insurance letter and [facility] financial assistance packet mailed to patient.

## 2024-04-17 ENCOUNTER — TELEPHONE (OUTPATIENT)
Age: 69
End: 2024-04-17

## 2024-04-17 NOTE — TELEPHONE ENCOUNTER
Patient called back asking for her pathology results.  Relayed Dr. Bell's note:  Called with path   No answer  Stage 2  2/4 nodes + on sln  Margins clear done with surgery  Needs mammaprint  Ok to give patient info if calls back    She asked about the mammaprint and I explained what the test is for, and that her test was sent last week but is not reported yet.  I told her we will get back in touch with her once it is back.

## 2024-04-19 ENCOUNTER — TELEPHONE (OUTPATIENT)
Age: 69
End: 2024-04-19

## 2024-04-19 NOTE — TELEPHONE ENCOUNTER
Called with mammaprint and surg path  Low risk stage 2   Will present cancer conf wed  And have christine list   She's doing ok

## 2024-04-24 ENCOUNTER — OFFICE VISIT (OUTPATIENT)
Age: 69
End: 2024-04-24

## 2024-04-24 VITALS — BODY MASS INDEX: 30.73 KG/M2 | HEIGHT: 64 IN | WEIGHT: 180 LBS

## 2024-04-24 DIAGNOSIS — C50.412 MALIGNANT NEOPLASM OF UPPER-OUTER QUADRANT OF LEFT FEMALE BREAST, UNSPECIFIED ESTROGEN RECEPTOR STATUS (HCC): Primary | ICD-10-CM

## 2024-04-24 PROCEDURE — 99024 POSTOP FOLLOW-UP VISIT: CPT | Performed by: SURGERY

## 2024-04-24 NOTE — PROGRESS NOTES
HISTORY OF PRESENT ILLNESS  Candida Forbes is a 68 y.o. female     HPI ESTABLISHED patient here for POST OP visit for S/P LEFT breast lumpectomy w/ lt slnbx. Pt feels great. Has no concerns today.         Sx 04/04/2024- LEFT breast US guided lumpectomy w/ LEFT slnbx  T2 N1 (2/4 nodes) margins clear  Mammaprint low risk luminal A       12/18/2023- LEFT breast biopsy- Invasive mammary grade 2, ER , [place] , Her2 Low expression, Ki 6 11-20%     [facility], genetic testing, MARISOL mutation gene         Family History:  Sister- Breast cancer dx 56           Review of Systems   All other systems reviewed and are negative.        Physical Exam  Vitals and nursing note reviewed.   Chest:      Comments: Incisions healing           ASSESSMENT and PLAN   Diagnosis Orders   1. Malignant neoplasm of upper-outer quadrant of left female breast, unspecified estrogen receptor status (HCC)        x 04/04/2024- LEFT breast US guided lumpectomy w/ LEFT slnbx  T2 N1 (2/4 nodes) margins clear  Mammaprint low risk luminal A       12/18/2023- LEFT breast biopsy- Invasive mammary grade 2, ER , [place] , Her2 Low expression, Ki 6 11-20%     [facility], genetic testing, MARISOL mutation gene        Per multi D team, no chemo, xrt and endocrine therapy   Will refer

## 2024-04-30 ENCOUNTER — TELEPHONE (OUTPATIENT)
Age: 69
End: 2024-04-30

## 2024-05-08 ENCOUNTER — TELEPHONE (OUTPATIENT)
Age: 69
End: 2024-05-08

## 2024-05-16 NOTE — PROGRESS NOTES
Candida Forbes is a 68 y.o. female here for new patient appt for left breast cancer.  Referred by Dr Bell  4/4/24 left breast lumpectomy  Pt here alone today.  Radiation consult on 28th with [facility].     1. Have you been to the ER, urgent care clinic since your last visit?  Hospitalized since your last visit?  New Pt    2. Have you seen or consulted any other health care providers outside of the [facility] since your last visit?  Include any pap smears or colon screening.  New Pt

## 2024-05-20 ENCOUNTER — OFFICE VISIT (OUTPATIENT)
Age: 69
End: 2024-05-20
Payer: MEDICARE

## 2024-05-20 VITALS
OXYGEN SATURATION: 95 % | HEIGHT: 64 IN | WEIGHT: 181.2 LBS | HEART RATE: 112 BPM | TEMPERATURE: 98.1 F | DIASTOLIC BLOOD PRESSURE: 83 MMHG | SYSTOLIC BLOOD PRESSURE: 163 MMHG | BODY MASS INDEX: 30.93 KG/M2

## 2024-05-20 DIAGNOSIS — Z17.0 MALIGNANT NEOPLASM OF UPPER-OUTER QUADRANT OF LEFT BREAST IN FEMALE, ESTROGEN RECEPTOR POSITIVE (HCC): Primary | ICD-10-CM

## 2024-05-20 DIAGNOSIS — C50.412 MALIGNANT NEOPLASM OF UPPER-OUTER QUADRANT OF LEFT BREAST IN FEMALE, ESTROGEN RECEPTOR POSITIVE (HCC): Primary | ICD-10-CM

## 2024-05-20 PROCEDURE — 3017F COLORECTAL CA SCREEN DOC REV: CPT | Performed by: INTERNAL MEDICINE

## 2024-05-20 PROCEDURE — G8400 PT W/DXA NO RESULTS DOC: HCPCS | Performed by: INTERNAL MEDICINE

## 2024-05-20 PROCEDURE — G8428 CUR MEDS NOT DOCUMENT: HCPCS | Performed by: INTERNAL MEDICINE

## 2024-05-20 PROCEDURE — 1123F ACP DISCUSS/DSCN MKR DOCD: CPT | Performed by: INTERNAL MEDICINE

## 2024-05-20 PROCEDURE — 99205 OFFICE O/P NEW HI 60 MIN: CPT | Performed by: INTERNAL MEDICINE

## 2024-05-20 PROCEDURE — G8417 CALC BMI ABV UP PARAM F/U: HCPCS | Performed by: INTERNAL MEDICINE

## 2024-05-20 PROCEDURE — 1036F TOBACCO NON-USER: CPT | Performed by: INTERNAL MEDICINE

## 2024-05-20 PROCEDURE — 1090F PRES/ABSN URINE INCON ASSESS: CPT | Performed by: INTERNAL MEDICINE

## 2024-05-20 RX ORDER — LETROZOLE 2.5 MG/1
2.5 TABLET, FILM COATED ORAL DAILY
Qty: 30 TABLET | Refills: 5 | Status: SHIPPED | OUTPATIENT
Start: 2024-05-20

## 2024-05-20 NOTE — PROGRESS NOTES
[facility] [place]  at [facility]  [address], [address], [address]  [place] [address]  [phone]        Oncology Note        Patient: Candida Forbes MRN: [id number]  SSN: [id number]    YOB: 1955  Age: 68 y.o.  Sex: female      Subjective:      Candida Forbes is a 68 y.o. female who I am seeing for a new diagnosis of left sided breast carcinoma. She felt a lump in her left breast. She went and say a gynaecologist. The breast mass was significant enough to warrant a mammogram a biopsy. She was then referred to Dr. Bell. Ms. Forbes underwent a right sided lumpectomy and sentinel LN excision on 04/04/2023. The final pathology showed multifocal tumor, largest being 15 mm, LN 2/4 +ve Gr 2, ER %, HI %, ki67 11-20% Her 2 1+. She comes in to discuss the role of adjuvant therapy.         Review of Systems:    Constitutional: negative  Eyes: negative  Ears, Nose, Mouth, Throat, and Face: negative  Respiratory: negative  Cardiovascular: negative  Gastrointestinal: negative  Genitourinary:negative  Integument/Breast: negative  Hematologic/Lymphatic: negative  Musculoskeletal:negative  Neurological: negative        Past Medical History:   Diagnosis Date    Breast cancer (HCC)     left side    Diabetes mellitus (HCC)     Dislocated elbow     bilateral    Fracture of arm     bilateral    Hyperlipidemia     Hypertension     PONV (postoperative nausea and vomiting)     only with wisdom teeth     Past Surgical History:   Procedure Laterality Date    BREAST LUMPECTOMY Left 4/4/2024    LEFT BREAST ULTRASOUND GUIDED LUMPECTOMY LEFT BREAST SENTINEL NODE BIOPSY performed by Ninfa Bell MD at [facility] AMBULATORY OR    OTHER SURGICAL HISTORY Left     ORIF lower arm    WISDOM TOOTH EXTRACTION        History reviewed. No pertinent family history.  Social History     Tobacco Use    Smoking status: Never    Smokeless tobacco: Never   Substance Use

## 2024-05-22 ENCOUNTER — CLINICAL DOCUMENTATION (OUTPATIENT)
Age: 69
End: 2024-05-22

## 2024-05-22 NOTE — PROGRESS NOTES
NCCN Distress Thermometer    Date Screening Completed: 5/20/24    Screening Declined:  [] Yes    Number that best describes how much distress you've experienced in the past week, including today?  0 [] - No distress 1 []      2 []      3 []      4 []       5 [x]       6 []      7 []      8 []      9 []       10 [] - Extreme distress    PROBLEM LIST  Have you had concerns about any of the items below in the past week, including today?      Physical Concerns Practical Concerns   [] Pain [] Taking care of myself    [] Sleep [] Taking care of others    [] Fatigue [] Work   [] Tobacco use  [] School   [] Substance use  [] Housing   [] Memory or concentration [] Finances   [] Sexual health [] Insurance   [] Changes in eating  [] Transportation   [] Loss or change of physical abilities  []     [] Having enough food   Emotional Concerns [] Access to medicine   [] Worry or anxiety [] Treatment decisions   [] Sadness or depression    [] Loss of interest or enjoyment  Spiritual or Buddhist Concerns   [] Grief or loss  [] Sense of meaning or purpose   [] Fear [] Changes in ghulam or beliefs   [] Loneliness  [] Death, dying, or afterlife   [] Anger [] Conflict between beliefs and cancer treatments    [] Changes in appearance [] Relationship with the sacred   [] Feelings of worthlessness or being a burden [] Ritual or dietary needs        Social Concerns     [] Relationship with spouse or partner     [] Relationship with children    [] Relationship with family members     [] Relationship with friends or coworkers     [] Communication with health care team     [] Ability to have children     [] Prejudice or discrimination        Other Concerns:  SW TO CALL - pt left before SW saw.     Patient received resource information and education:  [] Yes  [] No

## 2024-05-23 ENCOUNTER — CLINICAL DOCUMENTATION (OUTPATIENT)
Age: 69
End: 2024-05-23

## 2024-05-23 NOTE — PROGRESS NOTES
NCCN Distress Thermometer    Date Screening Completed: 05/20/2024    Screening Declined:  [] Yes    Number that best describes how much distress you've experienced in the past week, including today?  0 [] - No distress 1 []      2 []      3 []      4 []       5 [x]       6 []      7 []      8 []      9 []       10 [] - Extreme distress    PROBLEM LIST  Have you had concerns about any of the items below in the past week, including today?      Physical Concerns Practical Concerns   [] Pain [] Taking care of myself    [] Sleep [] Taking care of others    [] Fatigue [] Work   [] Tobacco use  [] School   [] Substance use  [] Housing   [] Memory or concentration [] Finances   [] Sexual health [] Insurance   [] Changes in eating  [] Transportation   [] Loss or change of physical abilities  []     [] Having enough food   Emotional Concerns [] Access to medicine   [] Worry or anxiety [] Treatment decisions   [] Sadness or depression    [] Loss of interest or enjoyment  Spiritual or Faith Concerns   [] Grief or loss  [] Sense of meaning or purpose   [] Fear [] Changes in ghulam or beliefs   [] Loneliness  [] Death, dying, or afterlife   [] Anger [] Conflict between beliefs and cancer treatments    [] Changes in appearance [] Relationship with the sacred   [] Feelings of worthlessness or being a burden [] Ritual or dietary needs        Social Concerns     [] Relationship with spouse or partner     [] Relationship with children    [] Relationship with family members     [] Relationship with friends or coworkers     [] Communication with health care team     [] Ability to have children     [] Prejudice or discrimination        Other Concerns:     Patient received resource information and education:  [x] Yes  [] No   Emailed after speaking to pt.

## 2024-05-23 NOTE — PROGRESS NOTES
[facility] Social Work   Psychosocial Assessment    [x] Med-Onc [facility] [] Med-Onc [facility] [] Med-Onc [facility] [] Rad-Onc [facility] [] Rad-Onc [facility] [] Rad-Onc [facility] [] Rad-Onc [facility] [] Breast Center       Patient: Candida Forbes    Reason for Assessment:    [] Social Work Referral  [x] Initial Assessment  [x] New Diagnosis  [] Other:     Advance Care Planning:  [] AMD Discussed and Completed  [] AMD Reviewed Verbally [] AMD Copy Provided  [x] Conversation Deferred [] Conversation Declined      Sources of Information:    [x] Patient  [] Family  [] Staff  [] Medical Record    Mental Status:    [x] Alert  [] Lethargic  [] Unresponsive  [] Unable to assess   Oriented to: [x] Person  [x] Place  [x] Time  [x] Situation      Relationship Status:  [] Single  [x]   [] Significant Other/Life Partner  []   []   []     Living Circumstances:  [] Lives Alone  [x] Family/Significant Other in Household  [] Roommates  [] Children in the Home  [] Paid Caregivers  [] Assisted Living Facility/Group Home  [] Skilled Nursing Facility  [] Homeless  [] Incarcerated  [] Environmental/Care Concerns  [] Other:    Employment Status:   [x] Employed Full-time  [] Employed Part-time  [] Homemaker  [] Retired  [] Short-Term Disability  [] Long-Term Disability  [] Unemployed  [] SSI  [] SSDI  [] Self-Employment    Transportation Resources:   [x] Self  [x] Family/Friends  [] Insurance  [] Community Programs  [] Other:    Barriers to Learning:    [] Language  [] Developmental  [] Cognitive  [] Altered Mental Status  [] Visual/Hearing Impairment  [] Unable to Read/Write  [] Motivational  [] Challenges Understanding Medical Jargon [x] No Barriers Identified      Financial/Legal Concerns:    [] Uninsured  [] Limited Income/Resources  [] Non-Citizen  [] Food Insecurity [] No Concerns Identified   [] Other:    Yarsani/Spiritual/Existential:   Does patient have any spiritual or Methodist beliefs? [] Yes [] No  Is the patient involved

## 2024-08-19 ENCOUNTER — TELEPHONE (OUTPATIENT)
Age: 69
End: 2024-08-19

## 2024-08-19 NOTE — TELEPHONE ENCOUNTER
Called pt to inform her that  wanted to wait until radiation finished to f/u. No answer, left a vm informing pt that she is r/s to 9/12/2024 @ 11:30 and to please call back if she needed to change it.

## 2024-08-19 NOTE — TELEPHONE ENCOUNTER
Patient has an appt on 8/21 at 9:30 am but has Rads at 9:40. She will not be finished with Rads until 8/26. How soon should should be seen after this for rescheduling?

## 2024-09-12 ENCOUNTER — OFFICE VISIT (OUTPATIENT)
Age: 69
End: 2024-09-12
Payer: MEDICARE

## 2024-09-12 VITALS
SYSTOLIC BLOOD PRESSURE: 133 MMHG | BODY MASS INDEX: 30.9 KG/M2 | WEIGHT: 181 LBS | TEMPERATURE: 98.2 F | DIASTOLIC BLOOD PRESSURE: 81 MMHG | OXYGEN SATURATION: 97 % | HEART RATE: 108 BPM | HEIGHT: 64 IN

## 2024-09-12 DIAGNOSIS — Z51.81 ENCOUNTER FOR MONITORING ADJUVANT HORMONAL THERAPY: ICD-10-CM

## 2024-09-12 DIAGNOSIS — C50.412 MALIGNANT NEOPLASM OF UPPER-OUTER QUADRANT OF LEFT BREAST IN FEMALE, ESTROGEN RECEPTOR POSITIVE (HCC): Primary | ICD-10-CM

## 2024-09-12 DIAGNOSIS — Z79.899 ENCOUNTER FOR MONITORING ADJUVANT HORMONAL THERAPY: ICD-10-CM

## 2024-09-12 DIAGNOSIS — Z17.0 MALIGNANT NEOPLASM OF UPPER-OUTER QUADRANT OF LEFT BREAST IN FEMALE, ESTROGEN RECEPTOR POSITIVE (HCC): Primary | ICD-10-CM

## 2024-09-12 PROCEDURE — G8417 CALC BMI ABV UP PARAM F/U: HCPCS | Performed by: INTERNAL MEDICINE

## 2024-09-12 PROCEDURE — G8400 PT W/DXA NO RESULTS DOC: HCPCS | Performed by: INTERNAL MEDICINE

## 2024-09-12 PROCEDURE — 99213 OFFICE O/P EST LOW 20 MIN: CPT | Performed by: INTERNAL MEDICINE

## 2024-09-12 PROCEDURE — 1090F PRES/ABSN URINE INCON ASSESS: CPT | Performed by: INTERNAL MEDICINE

## 2024-09-12 PROCEDURE — 1123F ACP DISCUSS/DSCN MKR DOCD: CPT | Performed by: INTERNAL MEDICINE

## 2024-09-12 PROCEDURE — 1036F TOBACCO NON-USER: CPT | Performed by: INTERNAL MEDICINE

## 2024-09-12 PROCEDURE — 3017F COLORECTAL CA SCREEN DOC REV: CPT | Performed by: INTERNAL MEDICINE

## 2024-09-12 PROCEDURE — G8427 DOCREV CUR MEDS BY ELIG CLIN: HCPCS | Performed by: INTERNAL MEDICINE

## 2024-10-09 ENCOUNTER — OFFICE VISIT (OUTPATIENT)
Age: 69
End: 2024-10-09
Payer: MEDICARE

## 2024-10-09 DIAGNOSIS — C50.412 MALIGNANT NEOPLASM OF UPPER-OUTER QUADRANT OF LEFT FEMALE BREAST, UNSPECIFIED ESTROGEN RECEPTOR STATUS (HCC): Primary | ICD-10-CM

## 2024-10-09 PROCEDURE — 99213 OFFICE O/P EST LOW 20 MIN: CPT | Performed by: SURGERY

## 2024-10-09 PROCEDURE — G8417 CALC BMI ABV UP PARAM F/U: HCPCS | Performed by: SURGERY

## 2024-10-09 PROCEDURE — 1123F ACP DISCUSS/DSCN MKR DOCD: CPT | Performed by: SURGERY

## 2024-10-09 PROCEDURE — 3017F COLORECTAL CA SCREEN DOC REV: CPT | Performed by: SURGERY

## 2024-10-09 PROCEDURE — G8400 PT W/DXA NO RESULTS DOC: HCPCS | Performed by: SURGERY

## 2024-10-09 PROCEDURE — 1036F TOBACCO NON-USER: CPT | Performed by: SURGERY

## 2024-10-09 PROCEDURE — G8484 FLU IMMUNIZE NO ADMIN: HCPCS | Performed by: SURGERY

## 2024-10-09 PROCEDURE — G8427 DOCREV CUR MEDS BY ELIG CLIN: HCPCS | Performed by: SURGERY

## 2024-10-09 PROCEDURE — 1090F PRES/ABSN URINE INCON ASSESS: CPT | Performed by: SURGERY

## 2024-10-09 NOTE — PROGRESS NOTES
HISTORY OF PRESENT ILLNESS  Candida Forbes is a 68 y.o. female     HPI ESTABLISHED patient here for follow up appointment. Denies any breast concerns or issues today.    Breast hx-  Started letrozole 9/2024 08/2024 - completed XRT.  Sx 04/04/2024- LEFT breast US guided lumpectomy w/ LEFT slnbx  T2 N1 (2/4 nodes) margins clear  Mammaprint low risk luminal A   12/18/2023- LEFT breast biopsy- Invasive mammary grade 2, ER , [place] , Her2 Low expression, Ki 6 11-20%     [facility], genetic testing, MARISOL mutation gene         Family History:  Sister- Breast cancer dx 56    Review of Systems   All other systems reviewed and are negative.        Physical Exam  Vitals and nursing note reviewed.   Chest:   Breasts:     Right: No swelling, bleeding, inverted nipple, mass, nipple discharge, skin change or tenderness.      Left: No swelling, bleeding, inverted nipple, mass, nipple discharge, skin change or tenderness.   Lymphadenopathy:      Upper Body:      Right upper body: No axillary adenopathy.      Left upper body: No axillary adenopathy.            ASSESSMENT and PLAN   Diagnosis Orders   1. Malignant neoplasm of upper-outer quadrant of left female breast, unspecified estrogen receptor status (HCC)          - no evidence local recurrence  - mammograms feb 2025  20 minutes was spent with patient on counseling and coordination of care.

## 2025-01-02 ENCOUNTER — TELEPHONE (OUTPATIENT)
Age: 70
End: 2025-01-02

## 2025-01-02 NOTE — TELEPHONE ENCOUNTER
Pt is calling about her appt scheduled for tomorrow 1/3 - pt states she thought she needed to have a mammogram before coming back and that is not scheduled until February. Should pt reschedule appt for tomorrow till after mammogram or continue with the 3 month f/u tomorrow?

## 2025-02-11 NOTE — PROGRESS NOTES
Candida Forbes is a 69 y.o. female here for 3 month follow up appt for left breast cancer.  Started Letrozole at last visit. Has not noticed any side effects.   Mammogram done at [facility].   Medications reviewed.     1. Have you been to the ER, urgent care clinic since your last visit?  Hospitalized since your last visit?    2. Have you seen or consulted any other health care providers outside of the [facility] since your last visit?  Include any pap smears or colon screening. [facility]

## 2025-02-13 ENCOUNTER — OFFICE VISIT (OUTPATIENT)
Age: 70
End: 2025-02-13
Payer: MEDICARE

## 2025-02-13 VITALS
TEMPERATURE: 98.1 F | HEART RATE: 92 BPM | SYSTOLIC BLOOD PRESSURE: 153 MMHG | BODY MASS INDEX: 30.49 KG/M2 | HEIGHT: 64 IN | OXYGEN SATURATION: 97 % | DIASTOLIC BLOOD PRESSURE: 78 MMHG | WEIGHT: 178.6 LBS

## 2025-02-13 DIAGNOSIS — Z78.0 ASYMPTOMATIC POSTMENOPAUSAL STATE: ICD-10-CM

## 2025-02-13 DIAGNOSIS — Z17.0 MALIGNANT NEOPLASM OF UPPER-OUTER QUADRANT OF LEFT BREAST IN FEMALE, ESTROGEN RECEPTOR POSITIVE (HCC): Primary | ICD-10-CM

## 2025-02-13 DIAGNOSIS — Z79.811 LONG TERM (CURRENT) USE OF AROMATASE INHIBITORS: ICD-10-CM

## 2025-02-13 DIAGNOSIS — C50.412 MALIGNANT NEOPLASM OF UPPER-OUTER QUADRANT OF LEFT BREAST IN FEMALE, ESTROGEN RECEPTOR POSITIVE (HCC): Primary | ICD-10-CM

## 2025-02-13 PROCEDURE — 1036F TOBACCO NON-USER: CPT | Performed by: NURSE PRACTITIONER

## 2025-02-13 PROCEDURE — G8400 PT W/DXA NO RESULTS DOC: HCPCS | Performed by: NURSE PRACTITIONER

## 2025-02-13 PROCEDURE — G8427 DOCREV CUR MEDS BY ELIG CLIN: HCPCS | Performed by: NURSE PRACTITIONER

## 2025-02-13 PROCEDURE — 99214 OFFICE O/P EST MOD 30 MIN: CPT | Performed by: NURSE PRACTITIONER

## 2025-02-13 PROCEDURE — 1159F MED LIST DOCD IN RCRD: CPT | Performed by: NURSE PRACTITIONER

## 2025-02-13 PROCEDURE — G8417 CALC BMI ABV UP PARAM F/U: HCPCS | Performed by: NURSE PRACTITIONER

## 2025-02-13 PROCEDURE — 1126F AMNT PAIN NOTED NONE PRSNT: CPT | Performed by: NURSE PRACTITIONER

## 2025-02-13 PROCEDURE — 3017F COLORECTAL CA SCREEN DOC REV: CPT | Performed by: NURSE PRACTITIONER

## 2025-02-13 PROCEDURE — 1160F RVW MEDS BY RX/DR IN RCRD: CPT | Performed by: NURSE PRACTITIONER

## 2025-02-13 PROCEDURE — 1123F ACP DISCUSS/DSCN MKR DOCD: CPT | Performed by: NURSE PRACTITIONER

## 2025-02-13 PROCEDURE — 1090F PRES/ABSN URINE INCON ASSESS: CPT | Performed by: NURSE PRACTITIONER

## 2025-02-13 NOTE — PROGRESS NOTES
[facility] [place]  at [facility]  [address], [address], [address]  [place] [address]  [phone]        Oncology Note        Patient: Candida Forbes MRN: [id number]  SSN: [id number]    YOB: 1955  Age: 69 y.o.  Sex: female           Diagnosis:      1. Left breast carcinoma:  T1c N1 (Stage II) infiltrating ductal carcinoma, Tumor size 15 mm, LN 2/4+, grade 2, ER %, NH %, Her 2 1+     Mammaprint reveals Luminal A low risk gene signature       Treatment:      1. S/P left breast lumpectomy 04/04/2024  2. Radiation completed 08/26/2024  3. Start Letrozole 09/2024    Subjective:      Candida Forbes is a 69 y.o. female who I am seeing for a new diagnosis of left sided breast carcinoma. She felt a lump in her left breast. She went and say a gynaecologist. The breast mass was significant enough to warrant a mammogram a biopsy. She was then referred to Dr. Bell. Ms. Forbes underwent a right sided lumpectomy and sentinel LN excision on 04/04/2023. The final pathology showed multifocal tumor, largest being 15 mm, LN 2/4 +ve Gr 2, ER %, NH %, ki67 11-20% Her 2 1+. She comes in to discuss the role of adjuvant therapy.       Interval History 2/13/25:  Candida is here today for follow up. She is a 69 y.o. female. She was started on Letrozole approximately 6 mos ago (9/2024). She is tolerating treatment well, no side effects. She has no acute complaints or concerns today.   She completed her mammogram on 2/5/25 which did not show any evidence of malignancy. She is overdue for DEXA, states no one has talked to her about completing this test. She denies any bone pain. She has not had any falls or injury.   Last follow up with the Breast Clinic was in 10/2024.     Review of Systems:    Constitutional: negative  Eyes: negative  Ears, Nose, Mouth, Throat, and Face: negative  Respiratory: negative  Cardiovascular:

## 2025-03-31 RX ORDER — LETROZOLE 2.5 MG/1
2.5 TABLET, FILM COATED ORAL DAILY
Qty: 90 TABLET | Refills: 3 | Status: SHIPPED | OUTPATIENT
Start: 2025-03-31

## 2025-03-31 NOTE — TELEPHONE ENCOUNTER
Requested Prescriptions     Pending Prescriptions Disp Refills    letrozole (FEMARA) 2.5 MG tablet [Pharmacy Med Name: LETROZOLE 2.5 MG TABLET] 90 tablet      Sig: TAKE 1 TABLET BY MOUTH DAILY    Pt to continue- seen 2/25  D Grato PharmD BCOP  For Pharmacy Admin Tracking Only    Program: Medical Group  CPA in place:  Yes  Recommendation Provided To: Pharmacy: 1  Intervention Detail: Refill(s) Provided  Intervention Accepted By: Pharmacy: 1   Time Spent (min): 5

## 2025-04-07 ENCOUNTER — OFFICE VISIT (OUTPATIENT)
Age: 70
End: 2025-04-07
Payer: MEDICARE

## 2025-04-07 VITALS — BODY MASS INDEX: 30.39 KG/M2 | HEIGHT: 64 IN | WEIGHT: 178 LBS

## 2025-04-07 DIAGNOSIS — Z85.3 HISTORY OF BREAST CANCER IN FEMALE: ICD-10-CM

## 2025-04-07 DIAGNOSIS — C50.412 MALIGNANT NEOPLASM OF UPPER-OUTER QUADRANT OF LEFT FEMALE BREAST, UNSPECIFIED ESTROGEN RECEPTOR STATUS: Primary | ICD-10-CM

## 2025-04-07 DIAGNOSIS — Z98.890 S/P LUMPECTOMY OF BREAST: ICD-10-CM

## 2025-04-07 DIAGNOSIS — Z92.3 S/P RADIATION THERAPY: ICD-10-CM

## 2025-04-07 PROCEDURE — 3017F COLORECTAL CA SCREEN DOC REV: CPT | Performed by: NURSE PRACTITIONER

## 2025-04-07 PROCEDURE — 1160F RVW MEDS BY RX/DR IN RCRD: CPT | Performed by: NURSE PRACTITIONER

## 2025-04-07 PROCEDURE — 1036F TOBACCO NON-USER: CPT | Performed by: NURSE PRACTITIONER

## 2025-04-07 PROCEDURE — 1159F MED LIST DOCD IN RCRD: CPT | Performed by: NURSE PRACTITIONER

## 2025-04-07 PROCEDURE — G8417 CALC BMI ABV UP PARAM F/U: HCPCS | Performed by: NURSE PRACTITIONER

## 2025-04-07 PROCEDURE — G8427 DOCREV CUR MEDS BY ELIG CLIN: HCPCS | Performed by: NURSE PRACTITIONER

## 2025-04-07 PROCEDURE — 1090F PRES/ABSN URINE INCON ASSESS: CPT | Performed by: NURSE PRACTITIONER

## 2025-04-07 PROCEDURE — G8400 PT W/DXA NO RESULTS DOC: HCPCS | Performed by: NURSE PRACTITIONER

## 2025-04-07 PROCEDURE — 1123F ACP DISCUSS/DSCN MKR DOCD: CPT | Performed by: NURSE PRACTITIONER

## 2025-04-07 PROCEDURE — 99213 OFFICE O/P EST LOW 20 MIN: CPT | Performed by: NURSE PRACTITIONER

## 2025-04-07 NOTE — PROGRESS NOTES
Mental Status: She is alert.   Psychiatric:         Attention and Perception: Attention normal.         Mood and Affect: Mood normal.         Speech: Speech normal.         Behavior: Behavior normal.        Ht 1.626 m (5' 4\")   Wt 80.7 kg (178 lb)   BMI 30.55 kg/m²       ASSESSMENT and PLAN   Diagnosis Orders   1. Malignant neoplasm of upper-outer quadrant of left female breast, unspecified estrogen receptor status        2. S/P lumpectomy of breast        3. S/P radiation therapy        4. History of breast cancer in female            Normal exam with no evidence of local recurrence.    Reviewed treatment and expected post-treatment changes.    Taking letrozole and reports that she is tolerating it well with no significant side effects.  DEXA ordered per Dr. Woodard's office.  Reports mammogram recently at [facility].  Will request report.    Will discuss additional breast imaging for high risk screening due to her MARISOL mutation at her next visit depending on mammogram results.    RTC in 6 months or sooner PRN. She is comfortable with this plan.  All questions answered and she stated understanding.          Total time spent for this patient - 20 minutes.

## 2025-05-06 ENCOUNTER — CLINICAL DOCUMENTATION (OUTPATIENT)
Age: 70
End: 2025-05-06

## 2025-06-12 ENCOUNTER — OFFICE VISIT (OUTPATIENT)
Age: 70
End: 2025-06-12
Payer: MEDICARE

## 2025-06-12 VITALS
DIASTOLIC BLOOD PRESSURE: 79 MMHG | TEMPERATURE: 98.2 F | HEIGHT: 64 IN | HEART RATE: 105 BPM | SYSTOLIC BLOOD PRESSURE: 134 MMHG | BODY MASS INDEX: 30.77 KG/M2 | WEIGHT: 180.2 LBS | OXYGEN SATURATION: 97 %

## 2025-06-12 DIAGNOSIS — C50.412 MALIGNANT NEOPLASM OF UPPER-OUTER QUADRANT OF LEFT BREAST IN FEMALE, ESTROGEN RECEPTOR POSITIVE (HCC): Primary | ICD-10-CM

## 2025-06-12 DIAGNOSIS — Z17.0 MALIGNANT NEOPLASM OF UPPER-OUTER QUADRANT OF LEFT BREAST IN FEMALE, ESTROGEN RECEPTOR POSITIVE (HCC): Primary | ICD-10-CM

## 2025-06-12 DIAGNOSIS — Z79.899 ENCOUNTER FOR MONITORING ADJUVANT HORMONAL THERAPY: ICD-10-CM

## 2025-06-12 DIAGNOSIS — Z51.81 ENCOUNTER FOR MONITORING ADJUVANT HORMONAL THERAPY: ICD-10-CM

## 2025-06-12 PROCEDURE — 1036F TOBACCO NON-USER: CPT | Performed by: INTERNAL MEDICINE

## 2025-06-12 PROCEDURE — 99213 OFFICE O/P EST LOW 20 MIN: CPT | Performed by: INTERNAL MEDICINE

## 2025-06-12 PROCEDURE — 1123F ACP DISCUSS/DSCN MKR DOCD: CPT | Performed by: INTERNAL MEDICINE

## 2025-06-12 PROCEDURE — 1126F AMNT PAIN NOTED NONE PRSNT: CPT | Performed by: INTERNAL MEDICINE

## 2025-06-12 PROCEDURE — G8400 PT W/DXA NO RESULTS DOC: HCPCS | Performed by: INTERNAL MEDICINE

## 2025-06-12 PROCEDURE — 1090F PRES/ABSN URINE INCON ASSESS: CPT | Performed by: INTERNAL MEDICINE

## 2025-06-12 PROCEDURE — G8428 CUR MEDS NOT DOCUMENT: HCPCS | Performed by: INTERNAL MEDICINE

## 2025-06-12 PROCEDURE — G8417 CALC BMI ABV UP PARAM F/U: HCPCS | Performed by: INTERNAL MEDICINE

## 2025-06-12 PROCEDURE — 3017F COLORECTAL CA SCREEN DOC REV: CPT | Performed by: INTERNAL MEDICINE

## 2025-06-12 NOTE — PROGRESS NOTES
Candida Forbes is a 69 y.o. female here for 3 month follow up appt for left breast cancer.  Taking Letrozole everyday.  Gave scheduling dept number again to schedule DEXA. She was confused and thought todays appt was for the scan.   Pt doing well. No concerns brought up.   Medications reviewed.     1. Have you been to the ER, urgent care clinic since your last visit?  Hospitalized since your last visit? no    2. Have you seen or consulted any other health care providers outside of the [facility] since your last visit?  Include any pap smears or colon screening. no  
ULTRASOUND GUIDED LUMPECTOMY LEFT BREAST SENTINEL NODE BIOPSY performed by Ninfa Bell MD at [facility] AMBULATORY OR    OTHER SURGICAL HISTORY Left     ORIF lower arm    WISDOM TOOTH EXTRACTION        History reviewed. No pertinent family history.  Social History     Tobacco Use    Smoking status: Never    Smokeless tobacco: Never   Substance Use Topics    Alcohol use: Not Currently      Prior to Admission medications    Medication Sig Start Date End Date Taking? Authorizing Provider   letrozole (FEMARA) 2.5 MG tablet TAKE 1 TABLET BY MOUTH DAILY 3/31/25  Yes Hemant Woodard MD   vitamin D (CHOLECALCIFEROL) 50 MCG (2000 UT) CAPS capsule Take 1 capsule by mouth daily   Yes Tahira Sierra MD   amLODIPine (NORVASC) 10 MG tablet Take 1 tablet by mouth nightly 5/14/18  Yes Tahira Sierra MD   atorvastatin (LIPITOR) 20 MG tablet Take 1 tablet by mouth nightly   Yes Tahira Sierra MD   hydroCHLOROthiazide (HYDRODIURIL) 25 MG tablet Take 1 tablet by mouth every morning 5/14/18  Yes Tahira Sierra MD   losartan (COZAAR) 100 MG tablet Take 1 tablet by mouth nightly 5/14/18  Yes Tahira Sierra MD   metFORMIN (GLUCOPHAGE) 500 MG tablet Take 2 tablets twice a day by oral route.   Yes Tahira Sierra MD              No Known Allergies        Objective:     Vitals:    06/12/25 1016   BP: 134/79   BP Site: Left Upper Arm   Patient Position: Sitting   Pulse: (!) 105   Temp: 98.2 °F (36.8 °C)   TempSrc: Temporal   SpO2: 97%   Weight: 81.7 kg (180 lb 3.2 oz)   Height: 1.626 m (5' 4\")        PHYSICAL EXAM:    GENERAL: alert, cooperative, no distress, appears stated age  EYE: conjunctivae/corneas clear  LYMPHATIC: Cervical, supraclavicular, and axillary nodes normal.   THROAT & NECK: normal and no erythema or exudates noted.   LUNG: clear to auscultation bilaterally  HEART: regular rate and rhythm  ABDOMEN: soft, non-tender, non-distended.   EXTREMITIES:  extremities normal, atraumatic, no

## 2025-08-07 ENCOUNTER — OFFICE VISIT (OUTPATIENT)
Age: 70
End: 2025-08-07
Payer: MEDICARE

## 2025-08-07 DIAGNOSIS — E11.65 TYPE 2 DIABETES MELLITUS WITH HYPERGLYCEMIA, UNSPECIFIED WHETHER LONG TERM INSULIN USE (HCC): Primary | ICD-10-CM

## 2025-08-07 PROCEDURE — G0108 DIAB MANAGE TRN  PER INDIV: HCPCS | Performed by: DIETITIAN, REGISTERED

## 2025-09-04 ENCOUNTER — CLINICAL SUPPORT (OUTPATIENT)
Age: 70
End: 2025-09-04
Payer: MEDICARE

## 2025-09-04 DIAGNOSIS — E11.65 TYPE 2 DIABETES MELLITUS WITH HYPERGLYCEMIA, UNSPECIFIED WHETHER LONG TERM INSULIN USE (HCC): Primary | ICD-10-CM

## 2025-09-04 PROCEDURE — G0109 DIAB MANAGE TRN IND/GROUP: HCPCS

## (undated) DEVICE — PENCIL SMK EVAC ALL IN 1 DSGN ENH VISIBILITY IMPROVED AIR

## (undated) DEVICE — Device

## (undated) DEVICE — LIQUIBAND RAPID ADHESIVE 36/CS 0.8ML: Brand: MEDLINE

## (undated) DEVICE — GLOVE SURG SZ 65 L12IN FNGR THK79MIL GRN LTX FREE

## (undated) DEVICE — SUTURE MCRYL SZ 4-0 L27IN ABSRB UD L19MM PS-2 1/2 CIR PRIM Y426H

## (undated) DEVICE — NEEDLE HYPO 22GA L1.5IN BLK S STL HUB POLYPR SHLD REG BVL

## (undated) DEVICE — TRAP FLUID BUFFALO FLTR

## (undated) DEVICE — SUTURE VCRL SZ 2-0 L27IN ABSRB UD L26MM SH 1/2 CIR J417H

## (undated) DEVICE — BLADE ES ELASTOMERIC COAT INSUL DURABLE BEND UPTO 90DEG

## (undated) DEVICE — Z DISC USE 2220190 SUTURE VCRL SZ 3-0 L27IN ABSRB UD L26MM SH 1/2 CIR J416H

## (undated) DEVICE — HYPODERMIC SAFETY NEEDLE: Brand: MONOJECT

## (undated) DEVICE — CHEST/BREAST-MRMCASU: Brand: MEDLINE INDUSTRIES, INC.

## (undated) DEVICE — GLOVE SURG SZ 6 L12IN FNGR THK79MIL GRN LTX FREE

## (undated) DEVICE — SUTURE PERMA-HAND SZ 2-0 L30IN NONABSORBABLE BLK L26MM SH K833H

## (undated) DEVICE — SURGICAL BRA: Brand: DEROYAL

## (undated) DEVICE — SPONGE GZ W4XL4IN COT 12 PLY TYP VII WVN C FLD DSGN STERILE

## (undated) DEVICE — SYRINGE MED 10ML LUERLOCK TIP W/O SFTY DISP

## (undated) DEVICE — PROVE COVER: Brand: UNBRANDED